# Patient Record
Sex: FEMALE | Race: WHITE | URBAN - NONMETROPOLITAN AREA
[De-identification: names, ages, dates, MRNs, and addresses within clinical notes are randomized per-mention and may not be internally consistent; named-entity substitution may affect disease eponyms.]

---

## 2022-09-22 ENCOUNTER — OFFICE VISIT (OUTPATIENT)
Dept: URBAN - NONMETROPOLITAN AREA CLINIC 6 | Facility: CLINIC | Age: 22
End: 2022-09-22
Payer: COMMERCIAL

## 2022-09-22 DIAGNOSIS — H52.223 REGULAR ASTIGMATISM, BILATERAL: ICD-10-CM

## 2022-09-22 DIAGNOSIS — G43.001 MIGRAINE WITHOUT AURA, NOT INTRACTABLE, WITH STATUS MIGRAINOSUS: Primary | ICD-10-CM

## 2022-09-22 PROCEDURE — 92004 COMPRE OPH EXAM NEW PT 1/>: CPT | Performed by: OPTOMETRIST

## 2022-09-22 ASSESSMENT — VISUAL ACUITY
OS: 20/20
OD: 20/20

## 2022-09-22 ASSESSMENT — INTRAOCULAR PRESSURE
OD: 13
OS: 14

## 2022-09-22 NOTE — IMPRESSION/PLAN
Impression: Regular astigmatism, bilateral: H52.223. Plan: Patient education regarding findings. Recommend glasses for optimal vision. Discussed adjustment period. Glasses Rx with prism given to patient today.

## 2022-09-22 NOTE — IMPRESSION/PLAN
Impression: Migraine without aura, not intractable, with status migrainosus: G43.001. Plan: Patient educated on findings and diagnosis. No ONH edema/pallor noted on clinical examination. Patient educated to keep follow up with PCP regarding headaches. Note to be sent to patient's PCP detailing findings. Advised the patient to contact PCP/report to emergency room if headaches increase in frequency/severity, cause nausea/vomiting, or continue wake him/her up in the middle of the night. Keep appointment wit neurologist. Dorys Bro trying glasses with prism for optimal vision and to help reduce aggravating the migraines.